# Patient Record
Sex: MALE | Race: WHITE | NOT HISPANIC OR LATINO | Employment: OTHER | URBAN - METROPOLITAN AREA
[De-identification: names, ages, dates, MRNs, and addresses within clinical notes are randomized per-mention and may not be internally consistent; named-entity substitution may affect disease eponyms.]

---

## 2019-10-18 ENCOUNTER — OFFICE VISIT CONVERTED (OUTPATIENT)
Dept: UROLOGY | Facility: CLINIC | Age: 24
End: 2019-10-18
Attending: UROLOGY

## 2019-12-06 ENCOUNTER — PROCEDURE VISIT CONVERTED (OUTPATIENT)
Dept: UROLOGY | Facility: CLINIC | Age: 24
End: 2019-12-06
Attending: UROLOGY

## 2020-02-19 ENCOUNTER — CONVERSION ENCOUNTER (OUTPATIENT)
Dept: SURGERY | Facility: CLINIC | Age: 25
End: 2020-02-19

## 2020-02-19 ENCOUNTER — OFFICE VISIT CONVERTED (OUTPATIENT)
Dept: UROLOGY | Facility: CLINIC | Age: 25
End: 2020-02-19
Attending: UROLOGY

## 2021-05-15 VITALS — RESPIRATION RATE: 12 BRPM | WEIGHT: 234 LBS | HEIGHT: 69 IN | BODY MASS INDEX: 34.66 KG/M2

## 2021-05-15 VITALS — WEIGHT: 234.12 LBS | RESPIRATION RATE: 12 BRPM | HEIGHT: 69 IN | BODY MASS INDEX: 34.67 KG/M2

## 2021-05-24 ENCOUNTER — OUTSIDE FACILITY SERVICE (OUTPATIENT)
Dept: SLEEP MEDICINE | Facility: HOSPITAL | Age: 26
End: 2021-05-24

## 2021-05-24 ENCOUNTER — HOSPITAL ENCOUNTER (OUTPATIENT)
Dept: SLEEP MEDICINE | Facility: HOSPITAL | Age: 26
Discharge: HOME | End: 2021-05-24
Attending: INTERNAL MEDICINE

## 2021-05-24 ENCOUNTER — TRANSCRIBE ORDERS (OUTPATIENT)
Dept: SLEEP MEDICINE | Facility: HOSPITAL | Age: 26
End: 2021-05-24

## 2021-05-24 DIAGNOSIS — G47.33 OSA (OBSTRUCTIVE SLEEP APNEA): Primary | ICD-10-CM

## 2021-05-24 DIAGNOSIS — R06.83 SNORING: ICD-10-CM

## 2021-05-24 DIAGNOSIS — G47.10 HYPERSOMNIA: ICD-10-CM

## 2021-05-24 PROCEDURE — 99244 OFF/OP CNSLTJ NEW/EST MOD 40: CPT | Performed by: INTERNAL MEDICINE

## 2021-06-03 NOTE — CONSULTS
Patient: SIGRID VILLANUEVA     Acct: [id number]     Report: #[id number]  MR #:  [id number]     DOS: 2021 0852     : 1995  DICTATING: BERTIN CACERES  ***Signed***  --------------------------------------------------------------------------------------------------------------------                              [facility] [employer] [facility]                          [place], [place]  [address]           __________________________________________________________________________         Patient Name:                   Attending Physician:    Sigrid Villanueva M.D.         Patient Visit # MR #            Admit Date  Disch Date     Location    [id number]    [id number]      2021                 SLEEP- -         Date of Birth    1995    __________________________________________________________________________    0814 - SLEEP OFFICE VISIT         DATE OF SERVICE:  2021         SLEEP CONSULTATION         REASON FOR VISIT:    Evaluation of sleep apnea because of snoring and daytime excessive sleepiness.         CONSULTATION REQUESTED BY:    Bernie Montanez M.D., Nohemi Bender         HISTORY OF PRESENT ILLNESS:    This is a 26-year-old gentleman who works for the [employer] and has    been sent for evaluation of these symptoms which have been going for about 3    years. He normally goes to bed around 10 p.m. and wakes up around 7 and    sometimes takes about an hour or two to fall asleep. Recently, his wife had    fallen and had a fractured lower extremity and she had 13 screws and shoes    and cast and he is also helping her and he also has 4 children. He feels    tired and exhausted. He has severe snoring in all positions and also has been    told that he stops breathing. He is a restless sleeper. He grinds his teeth.    In addition, he has sudden sleepiness in the daytime. He has  also been    diagnosed with SVT and is on metoprolol. In addition, in the month of July he    is being transferred to [place] and he is moving to [place] with his family.         PAST MEDICAL HISTORY:    1.  History of SVT.    2.  Depression.         PAST SURGICAL HISTORY:    Tonsillectomy as a child.         ALLERGIES:    No known drug allergies.         MEDICATIONS:    1.  Metoprolol 100 mg a day.    2.  Prozac 40 mg a day.         SOCIAL HISTORY:    He is  and lives with his wife. He does not smoke cigarettes. He does    not drink alcohol. He drinks one energy drink in the morning. He does not    like coffee or tea.         FAMILY HISTORY:    Positive for sleep apnea in the grandfather and otherwise, no other history.         REVIEW OF SYSTEMS:    SLEEP: The patient has snoring and daytime excessive sleepiness. [employer]    Sleepiness Scale of 16.    GENERAL: Positive for fatigue. No fever, no poor appetite.    HEENT: Positive for recurrent nosebleed. No ear pain, no sore mouth, no    persistent hoarseness, no nasal congestion, no postnasal drip.    CARDIOPULMONARY: No irregular heartbeat, no chest pain, no cough, no swollen    ankles.    GASTROINTESTINAL: No swallowing problem, no frequent heartburn.    ENDOCRINE: No excessive thirst, no always too cold, no always too warm, no    weight change, no hair loss.    MUSCULOSKELETAL: No painful joints, no back pain, no redness, no stiffness.    URINARY: No difficulty in urination, no painful urination, no frequent    urination.    NEURO/PSYCH: Positive for depression. No fainting spells, no dizziness, no    anxiety, no headache.    HEME/LYMPH: No swollen glands, no bruises/bleeds easily.    SKIN: No rash, no changes in nails.         PHYSICAL EXAMINATION:    CONSTITUTIONAL/VITALS: Blood pressure is 134/92, heart rate is 76, body mass    index is 32, neck size is 17, and weight is 217 pounds. This is an adult male    who is very pleasant and is a normal temperature.     HEAD: Atraumatic, normocephalic.    EYES: Pupils are round, equal and reacting to light and accommodation,    conjunctiva normal.    NOSE: No polyps, septum in then midline.    THROAT: Tonsils are not present. Oral airway is Mallampati class III.    NECK: Trachea in midline.  Thyroid not enlarged.    RESPIRATORY: Breath sounds normal, no wheezes, no crackles.    CARDIOVASCULAR: Heart rate normal, regular rhythm, no murmurs, no edema.    GASTROINTESTINAL: Abdomen soft and nontender, liver not enlarged, no ascites.    MUSCULOSKELETAL: Full range of motion of all 4 extremities, no neck rigidity,    no temporomandibular joint tenderness.    SKIN: Warm and moist, no cyanosis, no clubbing.    NEUROLOGIC: Oriented x3, no gross defects, gait normal.    PSYCHIATRIC: Mood is normal, memory and judgment normal.         ASSESSMENT/PLAN:    1.  Sleep apnea, witnessed apnea, unspecified. The patient's symptoms and        physical exam are consistent with sleep apnea. His pre-test probability        for sleep apnea is very high. I talked to the patient about the signs and        symptoms of sleep apnea, pathophysiology of sleep apnea, and consequences        of untreated sleep apnea. I also discussed the testing. We will proceed        with a home sleep test.    2.  Snoring, secondary to sleep apnea.    3.  Daytime excessive sleepiness with [place] Sleepiness Scale of 16        secondary to sleep apnea and fragmented sleep.    4.  I have talked to him about sleep hygiene and encouraged him not to take        any naps.    5.  Obesity. His body mass index is 32. Again, I talked to him about sleep        apnea and the relationship of sleep apnea and obesity and I encouraged        him to lose weight.         After the sleep test is done we will see whether we can get him CPAP as soon    as possible or he can get CPAP if he has sleep apnea at [place] which will be    easy for him to get the supplies.         To be electronically  signed in [employer]    [id number] BERTIN CACERES M.D.         RR:tri    D:  05/24/2021 11:32    T:  05/25/2021 08:35    #[id number]         CC: SLEEP LAB        [facility]         Until signed, this is an unconfirmed preliminary report that may contain    errors and is subject to change.         Electronically signed by BERTIN CACERES  05/26/2021 09:50     Disclaimer: Converted hospital document may not contain table formatting or lab diagrams. Please see [employer] for authenticated document.

## 2021-06-11 ENCOUNTER — HOSPITAL ENCOUNTER (OUTPATIENT)
Dept: SLEEP MEDICINE | Facility: HOSPITAL | Age: 26
Discharge: HOME | End: 2021-06-11
Admitting: INTERNAL MEDICINE

## 2021-06-11 DIAGNOSIS — G47.33 OSA (OBSTRUCTIVE SLEEP APNEA): ICD-10-CM

## 2021-06-11 DIAGNOSIS — R06.83 SNORING: ICD-10-CM

## 2021-06-11 DIAGNOSIS — G47.10 HYPERSOMNIA: ICD-10-CM

## 2021-06-11 PROCEDURE — 95806 SLEEP STUDY UNATT&RESP EFFT: CPT

## 2021-06-12 PROCEDURE — 95806 SLEEP STUDY UNATT&RESP EFFT: CPT | Performed by: INTERNAL MEDICINE

## 2021-06-16 ENCOUNTER — TELEPHONE (OUTPATIENT)
Dept: SLEEP MEDICINE | Facility: HOSPITAL | Age: 26
End: 2021-06-16

## 2021-06-16 NOTE — TELEPHONE ENCOUNTER
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  I scheduled his CC visit for 08/04/21 @2:30.

## 2021-12-29 ENCOUNTER — HOSPITAL ENCOUNTER (OUTPATIENT)
Age: 26
Discharge: HOME | End: 2021-12-29
Payer: COMMERCIAL

## 2021-12-29 ENCOUNTER — HOSPITAL ENCOUNTER (OUTPATIENT)
Dept: GENERAL RADIOLOGY | Age: 26
Discharge: HOME | End: 2021-12-29
Payer: COMMERCIAL

## 2021-12-29 DIAGNOSIS — Z00.00 ROUTINE GENERAL MEDICAL EXAMINATION AT A HEALTH CARE FACILITY: ICD-10-CM

## 2021-12-29 PROCEDURE — 73030 X-RAY EXAM OF SHOULDER: CPT

## 2021-12-29 PROCEDURE — 71046 X-RAY EXAM CHEST 2 VIEWS: CPT

## 2021-12-29 PROCEDURE — 72072 X-RAY EXAM THORAC SPINE 3VWS: CPT

## 2021-12-29 PROCEDURE — 72100 X-RAY EXAM L-S SPINE 2/3 VWS: CPT
